# Patient Record
Sex: MALE | Race: WHITE | NOT HISPANIC OR LATINO | Employment: STUDENT | URBAN - METROPOLITAN AREA
[De-identification: names, ages, dates, MRNs, and addresses within clinical notes are randomized per-mention and may not be internally consistent; named-entity substitution may affect disease eponyms.]

---
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
## 2024-08-04 NOTE — PROCEDURE: MEDICATION COUNSELING
NAME: Fahad Casianolizzie EIDB:  2002  MRN:    [id number]  DATE:  08/04/24    ASSESSMENT     Patient assessed for:    [x]Skin Breakdown:   1. [x] Skin WNL   2. [] Breakdown noted    A. RN Notified     B. Leads Repositioned   [x]Lead Placement:   1.[x] All leads in Place   2.[] Leads need Attention    A. Leads Replaced, Repositioned and Head Wrapped as needed.  [x] System Setup:   1. [x]System running as expected.   2. [] System Rebooted technical support called    3. [] technical support called     [x] Restock Equipment:   1. [x] Restocked    [x] Monitoring    1. [x] Continued    2. [] Discontinued    Summary     System was off for about an hour and was restarted by tech. System running as expected during the time of this note.           Torrie Turner       EEG TECHNICIAN   Nsaids Pregnancy And Lactation Text: These medications are considered safe up to 30 weeks gestation. It is excreted in breast milk.